# Patient Record
Sex: FEMALE | URBAN - METROPOLITAN AREA
[De-identification: names, ages, dates, MRNs, and addresses within clinical notes are randomized per-mention and may not be internally consistent; named-entity substitution may affect disease eponyms.]

---

## 2022-10-26 ENCOUNTER — OFFICE VISIT (OUTPATIENT)
Dept: URBAN - METROPOLITAN AREA CLINIC 33 | Facility: CLINIC | Age: 47
End: 2022-10-26
Payer: COMMERCIAL

## 2022-10-26 DIAGNOSIS — H52.4 PRESBYOPIA: Primary | ICD-10-CM

## 2022-10-26 PROCEDURE — 92002 INTRM OPH EXAM NEW PATIENT: CPT | Performed by: OPTOMETRIST

## 2022-10-26 ASSESSMENT — VISUAL ACUITY
OD: 20/20
OS: 20/20

## 2022-10-26 ASSESSMENT — INTRAOCULAR PRESSURE
OS: 18
OD: 18

## 2022-10-26 NOTE — IMPRESSION/PLAN
Impression: Presbyopia: H52.4. Plan: Educated patient about today's findings. Order refraction to determine patient's best corrected visual acuity as it relates to presbyopia- dispensed Rx to patient.  Return yearly for vision exam.